# Patient Record
Sex: FEMALE | Race: ASIAN
[De-identification: names, ages, dates, MRNs, and addresses within clinical notes are randomized per-mention and may not be internally consistent; named-entity substitution may affect disease eponyms.]

---

## 2018-07-25 NOTE — MMO
BILATERAL SCREENING MAMMOGRAM: 

 

Date:  07/25/18 

 

HISTORY:  

Screening.

 

COMPARISON:  

Mammogram from 2015. 

 

TECHNIQUE:  

Bilateral screening CC and MLO mammograms. 

 

This patient's mammogram was interpreted with the assistance of computer-aided detection.  

 

FINDINGS:

Benign calcifications present in both breasts. There is right surgical clip. 

 

No suspicious mass, architectural distortion, or microcalcifications. 

 

IMPRESSION: 

 

BIRADS 2:  Benign Finding(s)

 

Continued annual mammographic screening is recommended. 

 

POS: [facility]

## 2018-12-06 ENCOUNTER — HOSPITAL ENCOUNTER (OUTPATIENT)
Dept: HOSPITAL 92 - BICULT | Age: 78
Discharge: HOME | End: 2018-12-06
Attending: INTERNAL MEDICINE
Payer: MEDICARE

## 2018-12-06 DIAGNOSIS — N18.3: Primary | ICD-10-CM

## 2018-12-06 DIAGNOSIS — N28.1: ICD-10-CM

## 2018-12-06 DIAGNOSIS — K80.20: ICD-10-CM

## 2018-12-06 PROCEDURE — 76770 US EXAM ABDO BACK WALL COMP: CPT

## 2018-12-06 NOTE — ULT
RENAL SONOGRAM:

 

DATE: 12/6/2018.

 

HISTORY: 

Chronic kidney disease.

 

COMPARISON: 

2/13/2015.

 

FINDINGS: 

The right kidney measures 9.4 cm x 5.3 cm with the left kidney measuring 9.4 cm x 4.4 cm.  There are 
a few tiny anechoic cystic structures in the inferior pole of each kidney which measure less than a c
entimeter and may represent tiny cysts.  There is no hydronephrosis or solid renal mass visualized.  
The heterogeneous mass-like structure within the superior pole right kidney on the prior examination 
previously measuring 1.4 cm is again present with the greatest dimension on the current exam of 1.3 c
m but has less of a heterogeneous appearance and is hypoechoic to anechoic in appearance.  This may a
lso represent a small cyst, especially given slight interval decrease in size from prior exam and may
 have been related to a cyst with a small amount of hemorrhage or debris on the prior exam.  No renal
 calculus or perinephric fluid collection is present.

 

The urinary bladder is decompressed and not well evaluated.

 

Multiple echogenic foci are seen in the gallbladder lumen related to cholelithiasis.  

 

IMPRESSION: 

1.  Tiny bilateral renal cysts with stable hypoechoic cystic structure in the superior pole right kid
manda which demonstrated a heterogeneous appearance on the prior study; this lesion is less heterogeneo
us on the current study but is stable in size dating back to 2015 suggesting this is a benign finding
 and probably represents a cyst with probable debris or hemorrhage noted within the cyst on the prior
 exam.  No definite solid renal mass is seen.

 

2.  No evidence of hydronephrosis.

 

3.  Cholelithiasis.

 

POS: [facility]